# Patient Record
Sex: MALE | Race: OTHER | NOT HISPANIC OR LATINO | URBAN - METROPOLITAN AREA
[De-identification: names, ages, dates, MRNs, and addresses within clinical notes are randomized per-mention and may not be internally consistent; named-entity substitution may affect disease eponyms.]

---

## 2017-03-01 ENCOUNTER — EMERGENCY (EMERGENCY)
Facility: HOSPITAL | Age: 47
LOS: 1 days | Discharge: HOME | End: 2017-03-01
Attending: EMERGENCY MEDICINE
Payer: COMMERCIAL

## 2017-03-01 VITALS
TEMPERATURE: 98 F | RESPIRATION RATE: 18 BRPM | WEIGHT: 199.96 LBS | HEIGHT: 70 IN | HEART RATE: 67 BPM | OXYGEN SATURATION: 99 % | DIASTOLIC BLOOD PRESSURE: 76 MMHG | SYSTOLIC BLOOD PRESSURE: 123 MMHG

## 2017-03-01 DIAGNOSIS — M54.42 LUMBAGO WITH SCIATICA, LEFT SIDE: ICD-10-CM

## 2017-03-01 PROCEDURE — 99283 EMERGENCY DEPT VISIT LOW MDM: CPT | Mod: 25

## 2017-03-01 PROCEDURE — 99283 EMERGENCY DEPT VISIT LOW MDM: CPT

## 2017-03-01 NOTE — ED PROVIDER NOTE - MEDICAL DECISION MAKING DETAILS
45 y/o M pt w/ back pain. Pt already on muscle relaxant and Tylenol #3 by PCP. Pt w/ no sign for cord compression. Referred to outpatient management. 45 y/o M pt w/ back pain. Pt already on muscle relaxant and Tylenol #3 by PCP. Pt w/ no sign for cord compression. Referred to outpatient management. Pt at high risk for overdose given that he was prescribed multiple medications from different pharmacologic classess, will discharge home. 47 y/o M pt w/ back pain. Pt already on muscle relaxant and Tylenol #3 by PCP. Pt w/ no sign for cord compression. Referred to outpatient management. Pt at high risk for overdose given that he was prescribed multiple medications from different pharmacologic classess, will discharge home.    MRI from 2- report reviewed ordered by Stephane Elizalde, pt with degenerative changes in l3-l5, without any central canal stenosis, or disc herniation.

## 2017-03-01 NOTE — ED ADULT NURSE REASSESSMENT NOTE - NS ED NURSE REASSESS COMMENT FT1
pt seen and examined by md carter , cleared for d/c. discharge instructions given by me but pt refused to sign d/c instructions , wants to speak  to a supervisor " no tests were done " .seen and spoken to by Ms SAL Shafer , pt still refused to sign d/c instruction appears despite explanation .

## 2017-03-01 NOTE — ED PROVIDER NOTE - PROGRESS NOTE DETAILS
pt becoming hostile, speaking loudly, states I do not understand that the pain radiates down his leg, he is concerned about the worsening status of his leg, refusing to sign paperwork. Pt without any neurologic deficits, pt well appearing and has outpatient management of the lower back pain. pt ambulating without any difficulty, pt requesting to speak to manager. YONI Nava spoke at length with the patient. Pt very hostile in the emergency department.

## 2017-03-01 NOTE — ED PROVIDER NOTE - NEUROLOGICAL, MLM
Alert and oriented, no focal deficits, no motor or sensory deficits, positive straight leg raise, ambulating w/ normal gait

## 2017-03-01 NOTE — ED PROVIDER NOTE - MUSCULOSKELETAL, MLM
Spine appears normal, range of motion is not limited, no muscle or joint tenderness Spine appears normal, range of motion is not limited, no muscle or joint tenderness, no focal tenderness on exam in midline.

## 2017-03-01 NOTE — ED PROVIDER NOTE - OBJECTIVE STATEMENT
45 y/o M pt w/ no significant PMHx presents to the ED c/o back pain x3 days. Notes that pain is L sided and radiates down leg. Pt got an MRI by PCP which shows L4-L5 disc profusion with canal stenosis. Pt to see spine doctor Friday however pain worsened, prompting him to the ED. Denies  bowel/bladder dysfunction,  weakness, weight loss, saddle anesthesia or any other complaints. NKDA. 47 y/o M pt w/ no significant PMHx presents to the ED c/o back pain 1-2 weeks. Notes that pain is L sided and radiates down leg. Pt got an MRI by PCP which shows L4-L5 disc profusion with canal stenosis. Pt to see spine doctor Friday however pain worsened, prompting him to the ED. Denies  bowel/bladder dysfunction,  weakness, weight loss, saddle anesthesia or any other complaints, no ivdu, no fevers or chills. Pt notes that it is getting worse and therefore is concerned. Pt has an appointment with a spine doctor in 2 days. NKDA.

## 2017-03-01 NOTE — ED PROVIDER NOTE - NS ED MD SCRIBE ATTENDING SCRIBE SECTIONS
DISPOSITION/PHYSICAL EXAM/VITAL SIGNS( Pullset)/HISTORY OF PRESENT ILLNESS/PAST MEDICAL/SURGICAL/SOCIAL HISTORY/REVIEW OF SYSTEMS/HIV

## 2019-08-01 ENCOUNTER — APPOINTMENT (OUTPATIENT)
Dept: ENDOCRINOLOGY | Facility: CLINIC | Age: 49
End: 2019-08-01
Payer: COMMERCIAL

## 2019-08-01 VITALS
HEIGHT: 65.75 IN | TEMPERATURE: 98.2 F | HEART RATE: 74 BPM | BODY MASS INDEX: 34.15 KG/M2 | OXYGEN SATURATION: 97 % | DIASTOLIC BLOOD PRESSURE: 74 MMHG | WEIGHT: 210 LBS | SYSTOLIC BLOOD PRESSURE: 120 MMHG

## 2019-08-01 DIAGNOSIS — E53.8 DEFICIENCY OF OTHER SPECIFIED B GROUP VITAMINS: ICD-10-CM

## 2019-08-01 DIAGNOSIS — Z86.39 PERSONAL HISTORY OF OTHER ENDOCRINE, NUTRITIONAL AND METABOLIC DISEASE: ICD-10-CM

## 2019-08-01 DIAGNOSIS — E55.9 VITAMIN D DEFICIENCY, UNSPECIFIED: ICD-10-CM

## 2019-08-01 DIAGNOSIS — E83.52 HYPERCALCEMIA: ICD-10-CM

## 2019-08-01 PROCEDURE — 99204 OFFICE O/P NEW MOD 45 MIN: CPT | Mod: 25

## 2019-08-01 PROCEDURE — 36415 COLL VENOUS BLD VENIPUNCTURE: CPT

## 2019-08-01 NOTE — HISTORY OF PRESENT ILLNESS
[FreeTextEntry1] : CC: High calcium\par This is a 49-year-old male with hypercalcemia, here for evaluation.\par Recent health and the level was found to be 10.4 mg/dL. Albumin level is 4.4 g/dL. There is no prior history of hypercalcemia. He reports dry mouth. He is unsure if he had a kidney stone in the past and believes he may have past one after he had an episode of abdominal pain. He denies current abdominal pain. There is no known family history of hypercalcemia.

## 2019-08-01 NOTE — CONSULT LETTER
[Dear  ___] : Dear  [unfilled], [Please see my note below.] : Please see my note below. [Consult Letter:] : I had the pleasure of evaluating your patient, [unfilled]. [Consult Closing:] : Thank you very much for allowing me to participate in the care of this patient.  If you have any questions, please do not hesitate to contact me. [Sincerely,] : Sincerely, [FreeTextEntry3] : Gabriele Sellers M.D., F.A.CSarahE.\par

## 2019-08-01 NOTE — PHYSICAL EXAM
[Alert] : alert [No Acute Distress] : no acute distress [Well Nourished] : well nourished [Well Developed] : well developed [Healthy Appearance] : healthy appearance [Normal Voice/Communication] : normal voice communication [Normal Sclera/Conjunctiva] : normal sclera/conjunctiva [No Proptosis] : no proptosis [Normal Oropharynx] : the oropharynx was normal [No Neck Mass] : no neck mass was observed [Supple] : the neck was supple [No LAD] : no lymphadenopathy [Thyroid Not Enlarged] : the thyroid was not enlarged [No Respiratory Distress] : no respiratory distress [Normal Rate and Effort] : normal respiratory rhythm and effort [No Accessory Muscle Use] : no accessory muscle use [Normal Rate] : heart rate was normal  [Clear to Auscultation] : lungs were clear to auscultation bilaterally [Normal S1, S2] : normal S1 and S2 [Regular Rhythm] : with a regular rhythm [No Edema] : there was no peripheral edema [Spine Straight] : spine straight [No Stigmata of Cushings Syndrome] : no stigmata of cushings syndrome [No Clubbing, Cyanosis] : no clubbing  or cyanosis of the fingernails [Normal Gait] : normal gait [No Involuntary Movements] : no involuntary movements were seen [Normal Affect] : the affect was normal [Normal Insight/Judgement] : insight and judgment were intact [Normal Mood] : the mood was normal

## 2019-08-01 NOTE — ASSESSMENT
[FreeTextEntry1] : This is a 49-year-old male with hypercalcemia, vitamin D insufficiency, here for consultation.\par 1. Hypercalcemia\par Corrected calcium level was 10.1 (normal).\par Will check repeat calcium level for confirmation.\par Check PTH calcium is elevated to determine if hypercalcemia is PTH mediated.\par Further management will be based on the above results.\par 2. Vitamin D insufficiency\par Start vitamin D 2000 international units daily.\par 3. Vitamin B12 insufficiency\par Start vitamin B12 1000 mcg daily.

## 2019-08-02 DIAGNOSIS — R73.9 HYPERGLYCEMIA, UNSPECIFIED: ICD-10-CM

## 2019-08-02 LAB
ALBUMIN SERPL ELPH-MCNC: 4.5 G/DL
ALP BLD-CCNC: 53 U/L
ALT SERPL-CCNC: 30 U/L
ANION GAP SERPL CALC-SCNC: 11 MMOL/L
AST SERPL-CCNC: 15 U/L
BILIRUB SERPL-MCNC: 0.4 MG/DL
BUN SERPL-MCNC: 11 MG/DL
CALCIUM SERPL-MCNC: 10.5 MG/DL
CALCIUM SERPL-MCNC: 10.5 MG/DL
CHLORIDE SERPL-SCNC: 106 MMOL/L
CO2 SERPL-SCNC: 24 MMOL/L
CREAT SERPL-MCNC: 0.74 MG/DL
GLUCOSE SERPL-MCNC: 104 MG/DL
PARATHYROID HORMONE INTACT: 57 PG/ML
POTASSIUM SERPL-SCNC: 4.5 MMOL/L
PROT SERPL-MCNC: 7.2 G/DL
SODIUM SERPL-SCNC: 141 MMOL/L

## 2019-09-24 ENCOUNTER — APPOINTMENT (OUTPATIENT)
Dept: ENDOCRINOLOGY | Facility: CLINIC | Age: 49
End: 2019-09-24

## 2020-07-29 ENCOUNTER — APPOINTMENT (OUTPATIENT)
Dept: ORTHOPEDIC SURGERY | Facility: CLINIC | Age: 50
End: 2020-07-29
Payer: COMMERCIAL

## 2020-07-29 VITALS
WEIGHT: 207 LBS | BODY MASS INDEX: 29.63 KG/M2 | DIASTOLIC BLOOD PRESSURE: 91 MMHG | HEIGHT: 70 IN | SYSTOLIC BLOOD PRESSURE: 138 MMHG | HEART RATE: 77 BPM

## 2020-07-29 VITALS — HEIGHT: 70 IN | WEIGHT: 207 LBS | TEMPERATURE: 98.1 F | BODY MASS INDEX: 29.63 KG/M2

## 2020-07-29 DIAGNOSIS — M54.42 LUMBAGO WITH SCIATICA, LEFT SIDE: ICD-10-CM

## 2020-07-29 PROCEDURE — 99203 OFFICE O/P NEW LOW 30 MIN: CPT

## 2020-07-29 PROCEDURE — 72100 X-RAY EXAM L-S SPINE 2/3 VWS: CPT

## 2020-07-29 RX ORDER — DICLOFENAC SODIUM 75 MG/1
75 TABLET, DELAYED RELEASE ORAL
Qty: 40 | Refills: 1 | Status: ACTIVE | COMMUNITY
Start: 2020-07-29 | End: 1900-01-01

## 2020-08-12 ENCOUNTER — TRANSCRIPTION ENCOUNTER (OUTPATIENT)
Age: 50
End: 2020-08-12

## 2020-08-26 NOTE — HISTORY OF PRESENT ILLNESS
[de-identified] : Pt is a 49 yo M with Lt knee pain x\par Working as \par \par Previously seen July 2020 for Acute left-sided low back pain with left-sided sciatica.

## 2020-08-27 ENCOUNTER — APPOINTMENT (OUTPATIENT)
Dept: ORTHOPEDIC SURGERY | Facility: CLINIC | Age: 50
End: 2020-08-27
Payer: COMMERCIAL

## 2020-09-02 ENCOUNTER — APPOINTMENT (OUTPATIENT)
Dept: ORTHOPEDIC SURGERY | Facility: CLINIC | Age: 50
End: 2020-09-02
Payer: COMMERCIAL

## 2020-09-03 NOTE — ED ADULT TRIAGE NOTE - WEIGHT IN LBS
Patient tolerated procedure/anesthesia well, vss, no complications or concerns. Patient denies pain, patient denies nausea, and tolerates PO intake. Consents with chart. RN reviewed discharge instructions with patient and daughter at bedside,verbalized understanding.   
Ready for procedure. Pulmonary lab notified, RN states they will consent patient there  
199.9

## 2020-09-04 ENCOUNTER — APPOINTMENT (OUTPATIENT)
Dept: ORTHOPEDIC SURGERY | Facility: CLINIC | Age: 50
End: 2020-09-04
Payer: COMMERCIAL

## 2020-09-04 VITALS
SYSTOLIC BLOOD PRESSURE: 133 MMHG | BODY MASS INDEX: 31.1 KG/M2 | HEIGHT: 69 IN | HEART RATE: 75 BPM | DIASTOLIC BLOOD PRESSURE: 90 MMHG | TEMPERATURE: 97.4 F | WEIGHT: 210 LBS

## 2020-09-04 DIAGNOSIS — M54.16 RADICULOPATHY, LUMBAR REGION: ICD-10-CM

## 2020-09-04 PROCEDURE — 99213 OFFICE O/P EST LOW 20 MIN: CPT
